# Patient Record
Sex: MALE | Race: WHITE | NOT HISPANIC OR LATINO | Employment: OTHER | URBAN - METROPOLITAN AREA
[De-identification: names, ages, dates, MRNs, and addresses within clinical notes are randomized per-mention and may not be internally consistent; named-entity substitution may affect disease eponyms.]

---

## 2018-01-11 ENCOUNTER — OFFICE VISIT (OUTPATIENT)
Dept: RHEUMATOLOGY | Facility: CLINIC | Age: 53
End: 2018-01-11
Payer: COMMERCIAL

## 2018-01-11 ENCOUNTER — LAB VISIT (OUTPATIENT)
Dept: LAB | Facility: HOSPITAL | Age: 53
End: 2018-01-11
Attending: INTERNAL MEDICINE
Payer: COMMERCIAL

## 2018-01-11 VITALS
HEIGHT: 75 IN | WEIGHT: 288.13 LBS | HEART RATE: 62 BPM | BODY MASS INDEX: 35.82 KG/M2 | SYSTOLIC BLOOD PRESSURE: 124 MMHG | DIASTOLIC BLOOD PRESSURE: 80 MMHG

## 2018-01-11 DIAGNOSIS — M08.20: ICD-10-CM

## 2018-01-11 DIAGNOSIS — M08.20: Primary | ICD-10-CM

## 2018-01-11 LAB — ERYTHROCYTE [SEDIMENTATION RATE] IN BLOOD BY WESTERGREN METHOD: 5 MM/HR

## 2018-01-11 PROCEDURE — 86200 CCP ANTIBODY: CPT

## 2018-01-11 PROCEDURE — 86038 ANTINUCLEAR ANTIBODIES: CPT

## 2018-01-11 PROCEDURE — 82728 ASSAY OF FERRITIN: CPT

## 2018-01-11 PROCEDURE — 99999 PR PBB SHADOW E&M-EST. PATIENT-LVL III: CPT | Mod: PBBFAC,,, | Performed by: INTERNAL MEDICINE

## 2018-01-11 PROCEDURE — 99215 OFFICE O/P EST HI 40 MIN: CPT | Mod: S$GLB,,, | Performed by: INTERNAL MEDICINE

## 2018-01-11 PROCEDURE — 86431 RHEUMATOID FACTOR QUANT: CPT

## 2018-01-11 PROCEDURE — 86140 C-REACTIVE PROTEIN: CPT

## 2018-01-11 PROCEDURE — 85651 RBC SED RATE NONAUTOMATED: CPT | Mod: PO

## 2018-01-11 PROCEDURE — 36415 COLL VENOUS BLD VENIPUNCTURE: CPT | Mod: PO

## 2018-01-11 RX ORDER — PREDNISONE 5 MG/1
5 TABLET ORAL DAILY
Qty: 30 TABLET | Refills: 0 | Status: SHIPPED | OUTPATIENT
Start: 2018-01-11 | End: 2018-11-12 | Stop reason: ALTCHOICE

## 2018-01-11 ASSESSMENT — ROUTINE ASSESSMENT OF PATIENT INDEX DATA (RAPID3): MDHAQ FUNCTION SCORE: 0

## 2018-01-11 NOTE — PROGRESS NOTES
RHEUMATOLOGY CLINIC FOLLOW UP VISIT- first visit with me.  Chief complaints:-  I have been having worsening night sweats lately.     HPI:-  Scott Healy a 52 y.o. pleasant male comes in for a follow up visit with above chief complaints.  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  His methotrexate and Plaquenil was discontinued with no disease activity and has been in remission for more than 5 years now.  In the last couple of months he has noticed facial flushing, severe night sweats, low-grade fevers, intermittent joint flareup and persistent left jaw pain and he is here today to get checked out.  He denies any cough with sputum production, hemoptysis, persistent skin rash, mucosal ulcers.  No sick contacts.  Had mild sore throat 2 weeks ago which subsided with conservative treatment.    Review of Systems   Constitutional: Positive for fever. Negative for chills.   HENT: Negative for congestion and sore throat.    Eyes: Negative for blurred vision and redness.   Respiratory: Negative for cough and shortness of breath.    Cardiovascular: Negative for chest pain and leg swelling.   Gastrointestinal: Negative for abdominal pain.   Genitourinary: Negative for dysuria.   Musculoskeletal: Positive for joint pain. Negative for back pain, falls, myalgias and neck pain.   Skin: Positive for rash.   Neurological: Negative for headaches.   Endo/Heme/Allergies: Does not bruise/bleed easily.   Psychiatric/Behavioral: Negative for memory loss. The patient does not have insomnia.        Past Medical History:   Diagnosis Date    Acid reflux     Anxiety     Arthritis     Depression     Hyperlipidemia        Past Surgical History:   Procedure Laterality Date    ADENOIDECTOMY      APPENDECTOMY      FRACTURE SURGERY      TUBAL LIGATION      VASECTOMY          Social History   Substance Use  "Topics    Smoking status: Never Smoker    Smokeless tobacco: Not on file    Alcohol use Yes      Comment: social       Family History   Problem Relation Age of Onset    Diabetes Mellitus Mother     Macular degeneration Mother     Heart disease Father     Diabetes Mellitus Sister     Diabetes Mellitus Brother        Review of patient's allergies indicates:   Allergen Reactions    Penicillins            Physical examination:-    Vitals:    01/11/18 1540   BP: 124/80   Pulse: 62   Weight: 130.7 kg (288 lb 2.3 oz)   Height: 6' 3" (1.905 m)   PainSc:   3       Physical Exam   Constitutional: He is oriented to person, place, and time and well-developed, well-nourished, and in no distress. No distress.   HENT:   Head: Normocephalic and atraumatic.   Mouth/Throat: Oropharynx is clear and moist.   Eyes: Conjunctivae and EOM are normal. Pupils are equal, round, and reactive to light. Right eye exhibits no discharge. Left eye exhibits no discharge. No scleral icterus.   Neck: Normal range of motion. Neck supple.   Cardiovascular: Normal rate and intact distal pulses.    Pulmonary/Chest: Effort normal. No respiratory distress. He exhibits no tenderness.   Abdominal: Soft. There is no tenderness.   Musculoskeletal:   No synovitis in small joints of hands or feet.  Good range of motion in large joints. Mild tenderness over left TMJ joint.    Lymphadenopathy:     He has no cervical adenopathy.   Neurological: He is alert and oriented to person, place, and time. Gait normal.   Skin: Skin is warm. No rash noted. He is not diaphoretic. There is erythema. No pallor.   Psychiatric: Mood, memory, affect and judgment normal.       Medication List with Changes/Refills   New Medications    PREDNISONE (DELTASONE) 5 MG TABLET    Take 1 tablet (5 mg total) by mouth once daily.   Current Medications    ALPRAZOLAM (XANAX XR) 0.5 MG TB24        ASPIRIN (ECOTRIN) 81 MG EC TABLET    Take 81 mg by mouth once daily.    ATENOLOL (TENORMIN) " 50 MG TABLET    Take 50 mg by mouth once daily.    ATORVASTATIN (LIPITOR) 80 MG TABLET        CHOLECALCIFEROL, VITAMIN D3, (VITAMIN D3) 400 UNIT CHEW    Take by mouth once daily.    ESOMEPRAZOLE (NEXIUM) 40 MG CAPSULE    Take 40 mg by mouth before breakfast.    FEBUXOSTAT (ULORIC) 40 MG TAB    Take 40 mg by mouth once daily.    LOSARTAN-HYDROCHLOROTHIAZIDE 50-12.5 MG (HYZAAR) 50-12.5 MG PER TABLET        MULTIVITAMIN (ONE DAILY MULTIVITAMIN) PER TABLET    Take 1 tablet by mouth once daily.    PANTOPRAZOLE (PROTONIX) 40 MG TABLET        SERTRALINE (ZOLOFT) 50 MG TABLET        TESTOSTERONE CYPIONATE (DEPOTESTOTERONE CYPIONATE) 200 MG/ML INJECTION           Assessment/Plans:-  # Adult onset Still's disease:-  Adult onset stills disease which has been in remission for 4 years with progressively worsening night sweats, low grade fevers, flushing, facial erythema and intermittent arthritis. Check activity markers. Start low dose prednisone and consider further therapeutics based on results.   - Ferritin; Future  - KHURRAM; Future  - C-reactive protein; Future  - Cyclic citrul peptide antibody, IgG; Future  - Rheumatoid factor; Future  - Sedimentation rate, manual; Future  - Quantiferon Gold TB; Future  - predniSONE (DELTASONE) 5 MG tablet; Take 1 tablet (5 mg total) by mouth once daily.  Dispense: 30 tablet; Refill: 0  - QUANTIFERON GOLD TB; Future  - QUANTIFERON GOLD TB     # Return in about 3 months (around 4/11/2018).    Disclaimer: This note was prepared using voice recognition system and is likely to have sound alike errors and is not proof read.  Please call me with any questions.

## 2018-01-11 NOTE — ASSESSMENT & PLAN NOTE
Adult onset stills disease which has been in remission for 4 years with progressively worsening night sweats, low grade fevers, flushing, facial erythema and intermittent arthritis. Check activity markers. Start low dose prednisone and consider further therapeutics based on results.

## 2018-01-12 LAB
CCP AB SER IA-ACNC: 2.1 U/ML
CRP SERPL-MCNC: 7.7 MG/L
FERRITIN SERPL-MCNC: 251 NG/ML
RHEUMATOID FACT SERPL-ACNC: 11 IU/ML

## 2018-01-15 LAB — ANA SER QL IF: NORMAL

## 2018-01-17 ENCOUNTER — TELEPHONE (OUTPATIENT)
Dept: RHEUMATOLOGY | Facility: CLINIC | Age: 53
End: 2018-01-17

## 2018-01-17 NOTE — TELEPHONE ENCOUNTER
----- Message from Mickey Li MD sent at 1/17/2018  2:41 PM CST -----  Regarding: Notification of Unviewed Test Results  Contact: [phone]  Labs show stable results. Keep me posted about the symptoms.

## 2018-11-12 ENCOUNTER — OFFICE VISIT (OUTPATIENT)
Dept: OPHTHALMOLOGY | Facility: CLINIC | Age: 53
End: 2018-11-12
Payer: COMMERCIAL

## 2018-11-12 DIAGNOSIS — H52.203 MYOPIA WITH ASTIGMATISM AND PRESBYOPIA, BILATERAL: ICD-10-CM

## 2018-11-12 DIAGNOSIS — H52.13 MYOPIA WITH ASTIGMATISM AND PRESBYOPIA, BILATERAL: ICD-10-CM

## 2018-11-12 DIAGNOSIS — H52.4 MYOPIA WITH ASTIGMATISM AND PRESBYOPIA, BILATERAL: ICD-10-CM

## 2018-11-12 DIAGNOSIS — H04.123 DRY EYES: Primary | ICD-10-CM

## 2018-11-12 PROCEDURE — 99999 PR PBB SHADOW E&M-EST. PATIENT-LVL I: CPT | Mod: PBBFAC,,, | Performed by: OPTOMETRIST

## 2018-11-12 PROCEDURE — 92014 COMPRE OPH EXAM EST PT 1/>: CPT | Mod: S$GLB,,, | Performed by: OPTOMETRIST

## 2018-11-12 PROCEDURE — 92015 DETERMINE REFRACTIVE STATE: CPT | Mod: S$GLB,,, | Performed by: OPTOMETRIST

## 2018-11-12 NOTE — PROGRESS NOTES
HPI     Pts last exam was 10/14/16 with [facility]. PT c/o blurred overall va and wears   gls full time.   HPI    Any vision changes since last exam: no  Eye pain: no  Other ocular symptoms: am discharge/crusting OS only for about 6 months    Do you wear currently wear glasses or contacts? gls    Interested in contacts today? no    Do you plan on getting new glasses today? If needed      Last edited by Carol Young MA on 11/12/2018  3:03 PM. (History)            Assessment /Plan     For exam results, see Encounter Report.    Dry eyes  Refresh preservative-free AT bid-PRN OU  No evidence of infection at this time  Suspect dried mucus qam, could try gel tears qhs  If worsens or any changes, RTC    Discussed Lumify PRN for redness    Myopia with astigmatism and presbyopia, bilateral  Eyeglass Final Rx     Eyeglass Final Rx       Sphere Cylinder Axis Add    Right -1.00 +0.50 175 +2.00    Left -1.00 +0.25 165 +2.00    Expiration Date:  11/13/2019              RTC 1 yr for undilated eye exam or PRN if any problems.   Discussed above and answered questions.

## 2019-10-17 ENCOUNTER — TELEPHONE (OUTPATIENT)
Dept: OPHTHALMOLOGY | Facility: CLINIC | Age: 54
End: 2019-10-17

## 2023-07-29 NOTE — TELEPHONE ENCOUNTER
----- Message from Janak Louie sent at 10/17/2019 11:22 AM CDT -----  Contact: same  Patient called in and wanted to find out if his Rx for his glasses was still good?  Patient stated he was seen in 2018 for an urgent visit but remembers getting his exam?    Patient call back number is [phone]  
Called and spoke with patient, faxed copy of Rx to [facility]   
[facility]  50ft/independent